# Patient Record
Sex: FEMALE | Race: WHITE | Employment: FULL TIME | URBAN - METROPOLITAN AREA
[De-identification: names, ages, dates, MRNs, and addresses within clinical notes are randomized per-mention and may not be internally consistent; named-entity substitution may affect disease eponyms.]

---

## 2018-08-15 ENCOUNTER — HOSPITAL ENCOUNTER (OUTPATIENT)
Dept: LAB | Age: 43
Discharge: HOME | End: 2018-08-15
Payer: COMMERCIAL

## 2018-08-15 ENCOUNTER — OFFICE VISIT (OUTPATIENT)
Dept: HEMATOLOGY | Age: 43
End: 2018-08-15

## 2018-08-15 VITALS
HEART RATE: 109 BPM | DIASTOLIC BLOOD PRESSURE: 87 MMHG | WEIGHT: 226.2 LBS | BODY MASS INDEX: 41.62 KG/M2 | TEMPERATURE: 100 F | OXYGEN SATURATION: 96 % | RESPIRATION RATE: 18 BRPM | HEIGHT: 62 IN | SYSTOLIC BLOOD PRESSURE: 160 MMHG

## 2018-08-15 DIAGNOSIS — R74.8 ELEVATED LIVER ENZYMES: Primary | ICD-10-CM

## 2018-08-15 DIAGNOSIS — R74.8 ELEVATED LIVER ENZYMES: ICD-10-CM

## 2018-08-15 PROBLEM — K58.2 IRRITABLE BOWEL SYNDROME WITH BOTH CONSTIPATION AND DIARRHEA: Status: ACTIVE | Noted: 2018-08-15

## 2018-08-15 PROBLEM — E66.01 OBESITY, MORBID (HCC): Status: ACTIVE | Noted: 2018-08-15

## 2018-08-15 LAB
ALBUMIN SERPL-MCNC: 3.8 G/DL (ref 3.4–5)
ALBUMIN/GLOB SERPL: 1 {RATIO} (ref 0.8–1.7)
ALP SERPL-CCNC: 135 U/L (ref 45–117)
ALT SERPL-CCNC: 27 U/L (ref 13–56)
ANION GAP SERPL CALC-SCNC: 10 MMOL/L (ref 3–18)
AST SERPL-CCNC: 18 U/L (ref 15–37)
BASOPHILS # BLD: 0.1 K/UL (ref 0–0.1)
BASOPHILS NFR BLD: 1 % (ref 0–2)
BILIRUB DIRECT SERPL-MCNC: 0.1 MG/DL (ref 0–0.2)
BILIRUB SERPL-MCNC: 0.3 MG/DL (ref 0.2–1)
BUN SERPL-MCNC: 11 MG/DL (ref 7–18)
BUN/CREAT SERPL: 18 (ref 12–20)
CALCIUM SERPL-MCNC: 9.2 MG/DL (ref 8.5–10.1)
CHLORIDE SERPL-SCNC: 105 MMOL/L (ref 100–108)
CO2 SERPL-SCNC: 25 MMOL/L (ref 21–32)
CREAT SERPL-MCNC: 0.62 MG/DL (ref 0.6–1.3)
DIFFERENTIAL METHOD BLD: NORMAL
EOSINOPHIL # BLD: 0.3 K/UL (ref 0–0.4)
EOSINOPHIL NFR BLD: 2 % (ref 0–5)
ERYTHROCYTE [DISTWIDTH] IN BLOOD BY AUTOMATED COUNT: 13.5 % (ref 11.6–14.5)
FERRITIN SERPL-MCNC: 126 NG/ML (ref 8–388)
GLOBULIN SER CALC-MCNC: 4 G/DL (ref 2–4)
GLUCOSE SERPL-MCNC: 90 MG/DL (ref 74–99)
HCT VFR BLD AUTO: 41.5 % (ref 35–45)
HGB BLD-MCNC: 13.6 G/DL (ref 12–16)
IRON SATN MFR SERPL: 16 %
IRON SERPL-MCNC: 56 UG/DL (ref 50–175)
LYMPHOCYTES # BLD: 2.4 K/UL (ref 0.9–3.6)
LYMPHOCYTES NFR BLD: 23 % (ref 21–52)
MCH RBC QN AUTO: 29.9 PG (ref 24–34)
MCHC RBC AUTO-ENTMCNC: 32.8 G/DL (ref 31–37)
MCV RBC AUTO: 91.2 FL (ref 74–97)
MONOCYTES # BLD: 0.5 K/UL (ref 0.05–1.2)
MONOCYTES NFR BLD: 5 % (ref 3–10)
NEUTS SEG # BLD: 7.4 K/UL (ref 1.8–8)
NEUTS SEG NFR BLD: 69 % (ref 40–73)
PLATELET # BLD AUTO: 418 K/UL (ref 135–420)
PMV BLD AUTO: 9.7 FL (ref 9.2–11.8)
POTASSIUM SERPL-SCNC: 4 MMOL/L (ref 3.5–5.5)
PROT SERPL-MCNC: 7.8 G/DL (ref 6.4–8.2)
RBC # BLD AUTO: 4.55 M/UL (ref 4.2–5.3)
SODIUM SERPL-SCNC: 140 MMOL/L (ref 136–145)
TIBC SERPL-MCNC: 361 UG/DL (ref 250–450)
WBC # BLD AUTO: 10.7 K/UL (ref 4.6–13.2)

## 2018-08-15 PROCEDURE — 80076 HEPATIC FUNCTION PANEL: CPT | Performed by: NURSE PRACTITIONER

## 2018-08-15 PROCEDURE — 86256 FLUORESCENT ANTIBODY TITER: CPT | Performed by: NURSE PRACTITIONER

## 2018-08-15 PROCEDURE — 86038 ANTINUCLEAR ANTIBODIES: CPT | Performed by: NURSE PRACTITIONER

## 2018-08-15 PROCEDURE — 83516 IMMUNOASSAY NONANTIBODY: CPT | Performed by: NURSE PRACTITIONER

## 2018-08-15 PROCEDURE — 86704 HEP B CORE ANTIBODY TOTAL: CPT | Performed by: NURSE PRACTITIONER

## 2018-08-15 PROCEDURE — 36415 COLL VENOUS BLD VENIPUNCTURE: CPT | Performed by: NURSE PRACTITIONER

## 2018-08-15 PROCEDURE — 83540 ASSAY OF IRON: CPT | Performed by: NURSE PRACTITIONER

## 2018-08-15 PROCEDURE — 80048 BASIC METABOLIC PNL TOTAL CA: CPT | Performed by: NURSE PRACTITIONER

## 2018-08-15 PROCEDURE — 87521 HEPATITIS C PROBE&RVRS TRNSC: CPT | Performed by: NURSE PRACTITIONER

## 2018-08-15 PROCEDURE — 87340 HEPATITIS B SURFACE AG IA: CPT | Performed by: NURSE PRACTITIONER

## 2018-08-15 PROCEDURE — 86708 HEPATITIS A ANTIBODY: CPT | Performed by: NURSE PRACTITIONER

## 2018-08-15 PROCEDURE — 86706 HEP B SURFACE ANTIBODY: CPT | Performed by: NURSE PRACTITIONER

## 2018-08-15 PROCEDURE — 82728 ASSAY OF FERRITIN: CPT | Performed by: NURSE PRACTITIONER

## 2018-08-15 PROCEDURE — 85025 COMPLETE CBC W/AUTO DIFF WBC: CPT | Performed by: NURSE PRACTITIONER

## 2018-08-15 PROCEDURE — 82103 ALPHA-1-ANTITRYPSIN TOTAL: CPT | Performed by: NURSE PRACTITIONER

## 2018-08-15 RX ORDER — BISMUTH SUBSALICYLATE 262 MG
1 TABLET,CHEWABLE ORAL DAILY
COMMUNITY

## 2018-08-15 NOTE — MR AVS SNAPSHOT
[address] [facility], [place] 
 
 
 One [place], [place] [address] [address] [place] [address] 
[phone] Patient: Denia Baker MRN: [id number] YYA:[birth year] Visit Information Date & Time Provider Department Dept. Phone Encounter #  
 8/15/2018 11:05 AM Dorian Zhou NP Karina Puckett of  [address] [id number] Follow-up Instructions Return in about 4 weeks (around 9/12/2018) for Barry. Your Appointments 9/17/2018  2:45 PM  
Follow [place] with Dor[place] Zhou [place] [id number] [facility] ([facility]) Appt Note: 4wk f/u  
 One [facility], [facility] [address] [facility]  
  
   
 One [facility], [address] [place] [address] Upcoming Health Maintenance Date Due DTaP/Tdap/Td series (1 - Tdap) 6/8/1995 PAP AKA CERVICAL CYTOLOGY 6/8/1995 Influenza Age 5 to Adult 8/1/2018 Allergies as of 8/15/2018  Review Complete On: 8/15/2018 By: Michoacano Hudson No Known Allergies Current Immunizations  Never Reviewed No immunizations on file. Not reviewed this visit You Were Diagnosed With   
  
 Codes Comments Elevated liver enzymes    -  Primary ICD-10-CM: R74.8 ICD-9-CM: 790.5 Vitals BP Pulse Temp Resp Height(growth percentile) Weight(growth percentile) 160/87 (BP 1 Location: Right arm, BP Patient Position: Sitting) (!) 109 100 °F (37.8 °C) (Tympanic) 18 5' 2\" (1.575 m) 226 lb 3.2 oz (102.6 kg) SpO2 BMI OB Status Smoking Status 96% 41.37 kg/m2 IUD Never Smoker Vitals History BMI and BSA Data Body Mass Index Body Surface Area  
 41.37 kg/m 2 2.12 m 2 Your Updated Medication List  
  
   
This list is accurate as of 8/15/18 11:58 AM.  Always use your most recent med list.  
  
  
  
  
 multivitamin tablet Commonly known as:  ONE A DAY Take 1 Tab by mouth daily. ZYRTEC PO Take  by mouth. Follow-up Instructions Return in about 4 weeks (around 9/12/2018) for Jayyhira Elias. To-Do List   
 08/15/2018 Lab:  ACTIN (SMOOTH MUSCLE) ANTIBODY   
  
 08/15/2018 Lab:  ALPHA-1-ANTITRYPSIN, TOTAL   
  
 08/15/2018 Lab:  ANTI-NEUTROPHIL CYTOPLASMIC AB   
  
 08/15/2018 Lab:  ANTINUCLEAR ANTIBODIES, IFA   
  
 08/15/2018 Lab:  CBC WITH AUTOMATED DIFF   
  
 08/15/2018 Lab:  FERRITIN   
  
 08/15/2018 Lab:  HCV RNA BY CORWIN QL,RFLX TO QT   
  
 08/15/2018 Lab:  HEP A AB, TOTAL   
  
 08/15/2018 Lab:  HEP B SURFACE AB   
  
 08/15/2018 Lab:  HEP B SURFACE AG   
  
 08/15/2018 Lab:  HEPATIC FUNCTION PANEL   
  
 08/15/2018 Lab:  HEPATITIS B CORE AB, TOTAL   
  
 08/15/2018 Lab:  IRON PROFILE   
  
 08/15/2018 Lab:  METABOLIC PANEL, BASIC   
  
 08/15/2018 Lab:  MITOCHONDRIAL M2 AB   
  
 08/15/2018 Imaging:  US ABD LTD W ELASTOGRAPHY Introducing hospitals & HEALTH SERVICES! Gin Braden introduces [employer] patient portal. Now you can access parts of your medical record, email your doctor's office, and request medication refills online. 1. In your internet browser, go to [email or web address] 2. Click on the First Time User? Click Here link in the Sign In box. You will see the New Member Sign Up page. 3. Enter your [employer] Access Code exactly as it appears below. You will not need to use this code after youve completed the sign-up process. If you do not sign up before the expiration date, you must request a new code. · [employer] Access Code: [email or web address] Expires: 11/13/2018 11:55 AM 
 
4. Enter the last four digits of your Social Security Number (xxxx) and Date of Birth (mm/dd/yyyy) as indicated and click Submit. You will be taken to the next sign-up page. 5. Create a [employer] ID. This will be your [employer] login ID and cannot be changed, so think of one that is secure and easy to remember. 6. Create a [employer] password. You can change your password at any time. 7. Enter your Password Reset Question and Answer. This can be used at a later time if you forget your password. 8. Enter your e-mail address. You will receive e-mail notification when new information is available in [address] 9. Click Sign Up. You can now view and download portions of your medical record. 10. Click the Download Summary menu link to download a portable copy of your medical information. If you have questions, please visit the Frequently Asked Questions section of the [employer] website. Remember, [employer] is NOT to be used for urgent needs. For medical emergencies, dial 911. Now available from your iPhone and Android! Please provide this summary of care documentation to your next provider. Your primary care clinician is listed as Michael Friend. If you have any questions after today's visit, please call [phone].

## 2018-08-15 NOTE — PROGRESS NOTES
70 Chad Bland MD, Gene Ortega, Livan Gardner, Laurita Calvo, MEAGHAN Fernandes, [facility]   Jocelyn Enciso, LASHA Bolivar [facility] 136    at [address], [address] Zay Wolff  22.    [phone]    FAX: [address] [place] [address]    at [address], [address]    [phone]    FAX: [phone]       Patient Care Team:  Demetrio Lassiter MD as PCP - [facility])      Problem List  Never Reviewed          Codes Class Noted    Obesity, morbid [facility]) ICD-10-CM: E66.01  ICD-9-CM: 278.01  8/15/2018        Irritable bowel syndrome with both constipation and diarrhea ICD-10-CM: K58.2  ICD-9-CM: 564.1  8/15/2018        Elevated liver enzymes ICD-10-CM: R74.8  ICD-9-CM: 790.5  8/15/2018              The clinicians listed above have asked me to see Alexey Olson in consultation regarding elevated liver enzymes and its management. No medical records were available for review when the patient was here for the appointment. The patient is a 39 y.o.  female who was first noted to have abnormalities in liver enzymes in 12/2017. Serologic evaluation for markers of chronic liver disease were either not performed or available to me. Imaging of the liver was either not performed or the results are not available to me. An assessment of liver fibrosis with biopsy or elastography has not been performed. The patient had not started any new medications within 3 months preceding the elevation in liver chemistries. The patient notes itching. The patient has not experienced fatigue, pain in the right side over the liver.     The patient completes all daily activities without any functional limitations. ASSESSMENT AND PLAN:  Persistent elevation in alkaline phosphatase of unclear etiology at this time. No laboratory or imaging results were available to review. Will perform additional serologic tests to screen for other causes of chronic liver disease. The most likely causes for the liver chemistry abnormalities were discussed with the patient and include fatty liver disease, immune liver disorders, genetic metabolic liver disorders. Will perform laboratory testing to monitor liver function and degree of liver injury. This included BMP, hepatic panel, CBC with platelet count. Will perform imaging of the liver with ultrasound. The need to assess liver fibrosis was discussed. Sheer wave elastography can assess liver fibrosis and determine if a patient has advanced fibrosis or cirrhosis without the need for liver biopsy. Sheer wave elastography is now available at [facility] [employer]. This will be scheduled. If elastrography suggests advanced fibrosis then a liver biopsy should be considered. Treatment of other medical problems in patients with chronic liver disease  There are no contraindications for the patient to take any medications that are necessary for treatment of other medical issues. The patient can take oral diabetic agents for treatment of diabetes and statins for treatment of hypercholesterolemia. This will not impact the patient's current liver disease. The patient does not consume alcohol daily. Normal doses of acetaminophen can be used for pain as needed. Normal doses of acetaminophen as recommended on the label are not hepatotoxic, even in patients with cirrhosis. The patient does not have cirrhosis. Normal doses of NSAIDs can be used for pain as needed.       Counseling for alcohol in patients with chronic liver disease  The patient was counseled regarding alcohol consumption and the effect of alcohol on chronic liver disease. The patient does not consume any significant amount of alcohol. Vaccinations   The need for vaccination against viral hepatitis A and B will be assessed with serologic and instituted as appropriate. Routine vaccinations against other bacterial and viral agents can be performed as indicated. Annual flu vaccination should be administered if indicated. Screening for Hepatocellular Carcinoma  HCC screening is not necessary if the patient has no evidence of cirrhosis. ALLERGIES  No Known Allergies    MEDICATIONS  Current Outpatient Prescriptions   Medication Sig    multivitamin (ONE A DAY) tablet Take 1 Tab by mouth daily.  cetirizine HCl (ZYRTEC PO) Take  by mouth. No current facility-administered medications for this visit. SYSTEM REVIEW NOT RELATED TO LIVER DISEASE OR REVIEWED ABOVE:  Constitution systems: Negative for fever, chills, weight gain, weight loss. Eyes: Negative for visual changes. ENT: Negative for sore throat, painful swallowing. Respiratory: Negative for cough, hemoptysis, SOB. Cardiology: Negative for chest pain, palpitations. GI:  Negative for constipation or diarrhea. : Negative for urinary frequency, dysuria, hematuria, nocturia. Skin: Negative for rash. Hematology: Negative for easy bruising, blood clots. Musculo-skelatal: Negative for back pain, muscle pain, weakness. Neurologic: Negative for headaches, dizziness, vertigo, memory problems not related to HE. Psychology: Negative for anxiety, depression. FAMILY HISTORY:  The father has the following chronic diseases: cardiac disease. The mother has the following chronic diseases: HTN, DM, fatty liver. There is no family history of immune disorders. SOCIAL HISTORY:  The patient is . The patient has 2 children,   The patient has never used tobacco products. The patient has never consumed significant amounts of alcohol.     The patient currently works full time as . PHYSICAL EXAMINATION:  Visit Vitals    /87 (BP 1 Location: Right arm, BP Patient Position: Sitting)    Pulse (!) 109    Temp 100 °F (37.8 °C) (Tympanic)    Resp 18    Ht 5' 2\" (1.575 m)    Wt 226 lb 3.2 oz (102.6 kg)    SpO2 96%    BMI 41.37 kg/m2     General: No acute distress. Eyes: Sclera anicteric. ENT: No oral lesions. Thyroid normal.  Nodes: No adenopathy. Skin: No spider angiomata. No jaundice. No palmar erythema. Respiratory: Lungs clear to auscultation. Cardiovascular: Regular heart rate. No murmurs. No JVD. Abdomen: Soft non-tender. Liver size normal to percussion/palpation. Spleen not palpable. No obvious ascites. Extremities: No edema. No muscle wasting. No gross arthritic changes. Neurologic: Alert and oriented. Cranial nerves grossly intact. No asterixis. LABORATORY STUDIES:  Recent liver function panel, CBC with platelet count and BMP are not available. These studies will be performed. SEROLOGIES:  Not available or performed. Testing was performed today. LIVER HISTOLOGY:  Not available or performed    ENDOSCOPIC PROCEDURES:  Not available or performed    RADIOLOGY:  Not available or performed    OTHER TESTING:  Not available or performed      FOLLOW UP:  All of the issues listed in the Assessment and Plan were discussed with the patient. All questions were answered. The patient expressed a clear understanding of the above. [address] [place] [address] in 4 weeks to review all data and determine the treatment plan.       Ta Joy NP  [address] [place] [address],[address]    [address] [place] [address], [address]  [phone]

## 2018-08-16 LAB
A1AT SERPL-MCNC: 135 MG/DL (ref 90–200)
ACTIN IGG SERPL-ACNC: 13 UNITS (ref 0–19)
ANA TITR SER IF: NEGATIVE {TITER}
C-ANCA TITR SER IF: NORMAL TITER
HAV AB SER QL IA: NEGATIVE
HBV CORE AB SERPL QL IA: NEGATIVE
HBV SURFACE AB SER QL IA: POSITIVE
HBV SURFACE AB SERPL IA-ACNC: >1000 MIU/ML
HBV SURFACE AG SER QL: <0.1 INDEX
HBV SURFACE AG SER QL: NEGATIVE
HEP BS AB COMMENT,HBSAC: NORMAL
MITOCHONDRIA M2 IGG SER-ACNC: 3.3 UNITS (ref 0–20)
P-ANCA ATYPICAL TITR SER IF: NORMAL TITER
P-ANCA TITR SER IF: NORMAL TITER

## 2018-08-17 LAB
HCV RNA SERPL NAA+PROBE-LOG IU: NOT DETECTED HCV LOG 10IU/ML
HCV RNA SERPL PROBE AMP-ACNC: NOT DETECTED HCVIU/ML
HCV RNA SERPL QL NAA+PROBE: NOT DETECTED

## 2018-10-08 ENCOUNTER — HOSPITAL ENCOUNTER (OUTPATIENT)
Dept: ULTRASOUND IMAGING | Age: 43
Discharge: HOME | End: 2018-10-08
Attending: NURSE PRACTITIONER
Payer: COMMERCIAL

## 2018-10-08 ENCOUNTER — OFFICE VISIT (OUTPATIENT)
Dept: HEMATOLOGY | Age: 43
End: 2018-10-08

## 2018-10-08 DIAGNOSIS — R74.8 ELEVATED LIVER ENZYMES: ICD-10-CM

## 2018-10-08 DIAGNOSIS — R74.8 ELEVATED LIVER ENZYMES: Primary | ICD-10-CM

## 2018-10-08 PROCEDURE — 0346T US ABD LTD W ELASTOGRAPHY: CPT

## 2018-10-08 NOTE — MR AVS SNAPSHOT
[place] [address] 
[phone] Patient: Marilee Stephenson MRN: [id number] APQ:[birth year] Visit Information Date & Time Provider Department Dept. Phone Encounter #  
 10/8/2018  2:45 PM LASHA San [address] of  [address] [id number] Follow-up Instructions Return in about 1 year (around 10/8/2019) for [facility] . Upcoming Health Maintenance Date Due DTaP/Tdap/Td series (1 - Tdap) 6/8/1995 PAP AKA CERVICAL CYTOLOGY 6/8/1995 Influenza Age 5 to Adult 8/1/2018 Allergies as of 10/8/2018  Review Complete On: 10/8/2018 By: Montrell Cobian No Known Allergies Current Immunizations  Never Reviewed No immunizations on file. Not reviewed this visit Vitals BP Pulse Temp Resp Height(growth percentile) (!) 158/103 (BP 1 Location: Left arm, BP Patient Position: Sitting) (!) 108 99.9 °F (37.7 °C) (Tympanic) 20 5' 2\" (1.575 m) Weight(growth percentile) SpO2 BMI OB Status Smoking Status 226 lb (102.5 kg) 98% 41.34 kg/m2 IUD Never Smoker Vitals History BMI and BSA Data Body Mass Index Body Surface Area  
 41.34 kg/m 2 2.12 m 2 Your Updated Medication List  
  
   
This list is accurate as of 10/8/18  3:07 PM.  Always use your most recent med list.  
  
  
  
  
 multivitamin tablet Commonly known as:  ONE A DAY Take 1 Tab by mouth daily. ZYRTEC PO Take  by mouth. Follow-up Instructions Return in about 1 year (around 10/8/2019) for [facility] . Introducing [facility] SERVICES! [facility] introduces [employer] patient portal. Now you can access parts of your medical record, email your doctor's office, and request medication refills online. 1. In your internet browser, go to [email or web address] 2. Click on the First Time User? Click Here link in the Sign In box.  You will see the New Member Sign Up page. 3. Enter your [employer] Access Code exactly as it appears below. You will not need to use this code after youve completed the sign-up process. If you do not sign up before the expiration date, you must request a new code. · [employer] Access Code: [email or web address] Expires: 11/13/2018 11:55 AM 
 
4. Enter the last four digits of your Social Security Number (xxxx) and Date of Birth (mm/dd/yyyy) as indicated and click Submit. You will be taken to the next sign-up page. 5. Create a [employer] ID. This will be your [employer] login ID and cannot be changed, so think of one that is secure and easy to remember. 6. Create a [employer] password. You can change your password at any time. 7. Enter your Password Reset Question and Answer. This can be used at a later time if you forget your password. 8. Enter your e-mail address. You will receive e-mail notification when new information is available in [address] 9. Click Sign Up. You can now view and download portions of your medical record. 10. Click the Download Summary menu link to download a portable copy of your medical information. If you have questions, please visit the Frequently Asked Questions section of the [employer] website. Remember, [employer] is NOT to be used for urgent needs. For medical emergencies, dial 911. Now available from your iPhone and Android! Please provide this summary of care documentation to your next provider. Your primary care clinician is listed as Christine Zayas. If you have any questions after today's visit, please call [phone].

## 2018-10-08 NOTE — PROGRESS NOTES
[address], [place], [address], [place], [place]       Justinacorinna Iglesias, LASHA Silva, PASANDY Chang, [facility]   Gaston Hooks, LASHA Ross [facility] 136    at [address]    [address] [place] [address], [address] Zay Wolff  22.    [phone]    FAX: [address] [place] [address], [address] [place] [address], [address]    [phone]    FAX: [phone]       Patient Care Team:  Jamel Matias CNM as PCP - General (Certified Nurse Midwife)      Problem List  Date Reviewed: 10/11/2018          Codes Class Noted    Obesity, morbid ([facility] [address] ICD-10-CM: E66.01  ICD-9-CM: 278.01  8/15/2018        Irritable bowel syndrome with both constipation and diarrhea ICD-10-CM: K58.2  ICD-9-CM: 564.1  8/15/2018        Elevated liver enzymes ICD-10-CM: R74.8  ICD-9-CM: 790.5  8/15/2018            [facility] management of elevated liver enzymes. The active problem list, all pertinent past medical history, medications, liver histology, radiologic findings, and laboratory findings related to the liver disorder were reviewed with the patient. The patient is a 39 y.o.  female who was first noted to have abnormalities in liver enzymes in 12/2017. Serologic evaluation for markers of chronic liver disease were all negative. Imaging of the liver with ultrasound was performed 10/2018. The result demonstrates hepatic steatosis and a heterogeneous echotexture with a mildly nodular contour, concerning for early cirrhosis    Assessment of liver fibrosis was performed with sheer wave elastography at THE [facility] in 10/2018. The result was E Mean: 7.9 kPa which correlates with normal to mild fibrosis.  The E Range is 5.0-14.2 kPa and E Std: 2.6 kPa which makes results unreliable. The patient had not started any new medications within 3 months preceding the elevation in liver chemistries. The patient notes itching. The patient has not experienced fatigue, pain in the right side over the liver. The patient completes all daily activities without any functional limitations. ASSESSMENT AND PLAN:  Persistent elevation in alkaline phosphatase of unclear etiology at this time. Liver transaminases are normal.  ALP is elevated. Liver function is normal.  The platelet count is normal.      Based upon laboratory studies, Elastography, and imaging the patient may have advanced liver disease or cirrhosis. Serologic tests to screen for other causes of chronic liver disease were negative. The most likely causes for the liver chemistry abnormalities were discussed with the patient and include fatty liver disease. Have performed laboratory testing to monitor liver function and degree of liver injury. This included BMP, hepatic panel, CBC with platelet count. The need to assess liver fibrosis was discussed. Sheer wave elastography can assess liver fibrosis and determine if a patient has advanced fibrosis or cirrhosis without the need for liver biopsy. Sheer wave elastography is now available at [facility] [employer]. If elastrography suggests advanced fibrosis then a liver biopsy should be considered. The need to perform a liver biopsy to help determine the cause and severity of the liver test abnormalities was discussed. The risks of performing the liver biopsy including pain, puncture of the lung, gallbladder, intestine or kidney and bleeding were discussed. Elastography can be repeated annually to demonstrate that the treatment is resolving hepatic fibrosis.       Treatment of other medical problems in patients with chronic liver disease  There are no contraindications for the patient to take any medications that are necessary for treatment of other medical issues. The patient can take oral diabetic agents for treatment of diabetes and statins for treatment of hypercholesterolemia. This will not impact the patient's current liver disease. The patient does not consume alcohol daily. Normal doses of acetaminophen can be used for pain as needed. Normal doses of acetaminophen as recommended on the label are not hepatotoxic, even in patients with cirrhosis. The patient does not have cirrhosis. Normal doses of NSAIDs can be used for pain as needed. Counseling for alcohol in patients with chronic liver disease  The patient was counseled regarding alcohol consumption and the effect of alcohol on chronic liver disease. The patient does not consume any significant amount of alcohol. Vaccinations   The need for vaccination against viral hepatitis A and B will be assessed with serologic and instituted as appropriate. Routine vaccinations against other bacterial and viral agents can be performed as indicated. Annual flu vaccination should be administered if indicated. Screening for Hepatocellular Carcinoma  HCC screening is not necessary if the patient has no evidence of cirrhosis. ALLERGIES  No Known Allergies    MEDICATIONS  Current Outpatient Prescriptions   Medication Sig    multivitamin (ONE A DAY) tablet Take 1 Tab by mouth daily.  cetirizine HCl (ZYRTEC PO) Take  by mouth. No current facility-administered medications for this visit. SYSTEM REVIEW NOT RELATED TO LIVER DISEASE OR REVIEWED ABOVE:  Constitution systems: Negative for fever, chills, weight gain, weight loss. Eyes: Negative for visual changes. ENT: Negative for sore throat, painful swallowing. Respiratory: Negative for cough, hemoptysis, SOB. Cardiology: Negative for chest pain, palpitations. GI:  Negative for constipation or diarrhea. : Negative for urinary frequency, dysuria, hematuria, nocturia.    Skin: Negative for rash. Hematology: Negative for easy bruising, blood clots. Musculo-skelatal: Negative for back pain, muscle pain, weakness. Neurologic: Negative for headaches, dizziness, vertigo, memory problems not related to HE. Psychology: Negative for anxiety, depression. FAMILY HISTORY:  The father has the following chronic diseases: cardiac disease. The mother has the following chronic diseases: HTN, DM, fatty liver. There is no family history of immune disorders. SOCIAL HISTORY:  The patient is . The patient has 2 children,   The patient has never used tobacco products. The patient has never consumed significant amounts of alcohol. The patient currently works full time as . PHYSICAL EXAMINATION:  Visit Vitals    /79 (BP 1 Location: Left arm, BP Patient Position: Sitting)    Pulse (!) 108    Temp 99.9 °F (37.7 °C) (Tympanic)    Resp 20    Ht 5' 2\" (1.575 m)    Wt 226 lb (102.5 kg)    SpO2 98%    BMI 41.34 kg/m2     General: No acute distress. Eyes: Sclera anicteric. ENT: No oral lesions. Thyroid normal.  Nodes: No adenopathy. Skin: No spider angiomata. No jaundice. No palmar erythema. Respiratory: Lungs clear to auscultation. Cardiovascular: Regular heart rate. No murmurs. No JVD. Abdomen: Soft non-tender. Liver size normal to percussion/palpation. Spleen not palpable. No obvious ascites. Extremities: No edema. No muscle wasting. No gross arthritic changes. Neurologic: Alert and oriented. Cranial nerves grossly intact. No asterixis.     LABORATORY STUDIES:  Liver [place] of [address] Units 8/15/2018   WBC 4.6 - 13.2 K/uL 10.7   ANC 1.8 - 8.0 K/UL 7.4   HGB 12.0 - 16.0 g/dL 13.6    - 420 K/uL 418   AST 15 - 37 U/L 18   ALT 13 - 56 U/L 27   Alk Phos 45 - 117 U/L 135 (H)   Bili, Total 0.2 - 1.0 MG/DL 0.3   Bili, Direct 0.0 - 0.2 MG/DL 0.1   Albumin 3.4 - 5.0 g/dL 3.8   BUN 7.0 - 18 MG/DL 11   Creat 0.6 - 1.3 MG/DL 0.62   Na 136 - 145 mmol/L 140   K 3.5 - 5.5 mmol/L 4.0   Cl 100 - 108 mmol/L 105   CO2 21 - 32 mmol/L 25   Glucose 74 - 99 mg/dL 90       SEROLOGIES:  Serologies Latest Ref Rng & Units 8/15/2018   Hep A Ab, Total NEGATIVE   NEGATIVE   Hep B Surface Ag <1.00 Index <0.10   Hep B Surface Ag Interp NEG   NEGATIVE   Hep B Core Ab, Total NEGATIVE   NEGATIVE   Hep B Surface Ab >10.0 mIU/mL >1000.00   Hep B Surface Ab Interp POS   POSITIVE   Ferritin 8 - 388 NG/   Iron % Saturation % 16   RAJAT, IFA  NEGATIVE   C-ANCA Neg:<1:20 titer <1:20   P-ANCA Neg:<1:20 titer <1:20   ANCA Neg:<1:20 titer <1:20   ASMCA 0 - 19 Units 13   M2 Ab 0.0 - 20.0 Units 3.3   Alpha-1 antitrypsin level 90 - 200 mg/dL 135       LIVER HISTOLOGY:  10/2018. Sheer wave elastography performed at [facility]. EkPa was E Range: 5.0-14.2 kPa, E Mean: 7.9 kPa, E Median: 7.1 kPa, E Std: 2.6 kPa. The results suggested a fibrosis level of F1.    ENDOSCOPIC PROCEDURES:  Not available or performed    RADIOLOGY:  10/2018. Ultrasound of liver. Echogenic consistent with fatty liver. Heterogeneous echotexture with a mildly nodular contour, concerning for early cirrhosis. No liver mass lesions. No dilated bile ducts. No ascites. OTHER TESTING:  Not available or performed      FOLLOW UP:  All of the issues listed in the Assessment and Plan were discussed with the patient. All questions were answered. The patient expressed a clear understanding of the above. Follow-up [facility] [address] 32 in 2 weeks after liver biopsy with MLS.        EVAN Spivey-[facility]  [address], [address]  [address] [facility], [address]  [phone]  [address]

## 2018-10-10 VITALS
HEART RATE: 108 BPM | TEMPERATURE: 99.9 F | HEIGHT: 62 IN | BODY MASS INDEX: 41.59 KG/M2 | DIASTOLIC BLOOD PRESSURE: 79 MMHG | OXYGEN SATURATION: 98 % | WEIGHT: 226 LBS | SYSTOLIC BLOOD PRESSURE: 167 MMHG | RESPIRATION RATE: 20 BRPM

## 2018-10-11 ENCOUNTER — TELEPHONE (OUTPATIENT)
Dept: HEMATOLOGY | Age: 43
End: 2018-10-11

## 2018-10-11 NOTE — TELEPHONE ENCOUNTER
Left message for patient to call back to discuss US/elastography results and need to schedule LBX to confirm diagnosis.

## 2025-02-17 ENCOUNTER — NEW PATIENT (OUTPATIENT)
Age: 50
End: 2025-02-17

## 2025-02-17 DIAGNOSIS — H52.4: ICD-10-CM

## 2025-02-17 PROCEDURE — 92015 DETERMINE REFRACTIVE STATE: CPT

## 2025-02-17 PROCEDURE — 92004 COMPRE OPH EXAM NEW PT 1/>: CPT
